# Patient Record
Sex: FEMALE | Race: WHITE
[De-identification: names, ages, dates, MRNs, and addresses within clinical notes are randomized per-mention and may not be internally consistent; named-entity substitution may affect disease eponyms.]

---

## 2020-05-12 ENCOUNTER — HOSPITAL ENCOUNTER (OUTPATIENT)
Dept: HOSPITAL 94 - SSTAY O | Age: 60
Discharge: HOME | End: 2020-05-12
Attending: RADIOLOGY
Payer: COMMERCIAL

## 2020-05-12 VITALS — DIASTOLIC BLOOD PRESSURE: 80 MMHG | SYSTOLIC BLOOD PRESSURE: 186 MMHG

## 2020-05-12 VITALS — WEIGHT: 259.7 LBS | HEIGHT: 70 IN | BODY MASS INDEX: 37.18 KG/M2

## 2020-05-12 VITALS — SYSTOLIC BLOOD PRESSURE: 192 MMHG | DIASTOLIC BLOOD PRESSURE: 82 MMHG

## 2020-05-12 VITALS — DIASTOLIC BLOOD PRESSURE: 79 MMHG | SYSTOLIC BLOOD PRESSURE: 176 MMHG

## 2020-05-12 VITALS — DIASTOLIC BLOOD PRESSURE: 100 MMHG | SYSTOLIC BLOOD PRESSURE: 130 MMHG

## 2020-05-12 DIAGNOSIS — Z88.5: ICD-10-CM

## 2020-05-12 DIAGNOSIS — Z98.890: ICD-10-CM

## 2020-05-12 DIAGNOSIS — E78.00: ICD-10-CM
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
## 2020-05-12 DIAGNOSIS — T82.49XA: Primary | ICD-10-CM

## 2020-05-12 DIAGNOSIS — N18.6: ICD-10-CM

## 2020-05-12 DIAGNOSIS — Z88.2: ICD-10-CM
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
## 2020-05-12 DIAGNOSIS — I12.0: ICD-10-CM

## 2020-05-12 DIAGNOSIS — Z79.899: ICD-10-CM

## 2020-05-12 DIAGNOSIS — E11.22: ICD-10-CM

## 2020-05-12 LAB
BASOPHILS # BLD AUTO: 0.1 X10'3 (ref 0–0.2)
BASOPHILS NFR BLD AUTO: 0.8 % (ref 0–1)
EOSINOPHIL # BLD AUTO: 0.2 X10'3 (ref 0–0.9)
EOSINOPHIL NFR BLD AUTO: 2.1 % (ref 0–6)
ERYTHROCYTE [DISTWIDTH] IN BLOOD BY AUTOMATED COUNT: 13.9 % (ref 11.5–14.5)
HCT VFR BLD AUTO: 35.4 % (ref 35–45)
HGB BLD-MCNC: 11.9 G/DL (ref 12–16)
LYMPHOCYTES # BLD AUTO: 2.6 X10'3 (ref 1.1–4.8)
LYMPHOCYTES NFR BLD AUTO: 29.7 % (ref 21–51)
MCH RBC QN AUTO: 32.4 PG (ref 27–31)
MCHC RBC AUTO-ENTMCNC: 33.7 G/DL (ref 33–36.5)
MCV RBC AUTO: 96.3 FL (ref 78–98)
MONOCYTES # BLD AUTO: 0.5 X10'3 (ref 0–0.9)
MONOCYTES NFR BLD AUTO: 5.7 % (ref 2–12)
NEUTROPHILS # BLD AUTO: 5.3 X10'3 (ref 1.8–7.7)
NEUTROPHILS NFR BLD AUTO: 61.7 % (ref 42–75)
PLATELET # BLD AUTO: 317 X10'3 (ref 140–440)
PMV BLD AUTO: 7.2 FL (ref 7.4–10.4)
RBC # BLD AUTO: 3.68 X10'6 (ref 4.2–5.6)
WBC # BLD AUTO: 8.6 X10'3 (ref 4.5–11)

## 2020-05-12 PROCEDURE — 76937 US GUIDE VASCULAR ACCESS: CPT

## 2020-05-12 PROCEDURE — 85025 COMPLETE CBC W/AUTO DIFF WBC: CPT

## 2020-05-12 PROCEDURE — 77001 FLUOROGUIDE FOR VEIN DEVICE: CPT

## 2020-05-12 PROCEDURE — 99152 MOD SED SAME PHYS/QHP 5/>YRS: CPT

## 2020-05-12 PROCEDURE — 36581 REPLACE TUNNELED CV CATH: CPT

## 2020-05-12 PROCEDURE — 36415 COLL VENOUS BLD VENIPUNCTURE: CPT

## 2020-05-12 NOTE — NUR
Rec patient back from IR via gurney, awake alert and oriented. TDC in place to right chest 
with clean dry intact dressing. Given sandwich and drink